# Patient Record
Sex: FEMALE | Race: AMERICAN INDIAN OR ALASKA NATIVE
[De-identification: names, ages, dates, MRNs, and addresses within clinical notes are randomized per-mention and may not be internally consistent; named-entity substitution may affect disease eponyms.]

---

## 2019-02-12 ENCOUNTER — HOSPITAL ENCOUNTER (EMERGENCY)
Dept: HOSPITAL 31 - C.ER | Age: 44
Discharge: HOME | End: 2019-02-12
Payer: SELF-PAY

## 2019-02-12 VITALS — SYSTOLIC BLOOD PRESSURE: 142 MMHG | TEMPERATURE: 97.8 F | RESPIRATION RATE: 18 BRPM | DIASTOLIC BLOOD PRESSURE: 76 MMHG

## 2019-02-12 VITALS — BODY MASS INDEX: 24 KG/M2

## 2019-02-12 VITALS — HEART RATE: 85 BPM

## 2019-02-12 VITALS — OXYGEN SATURATION: 100 %

## 2019-02-12 DIAGNOSIS — R19.7: ICD-10-CM

## 2019-02-12 DIAGNOSIS — R11.10: Primary | ICD-10-CM

## 2019-02-12 LAB
ALBUMIN SERPL-MCNC: 4.5 G/DL (ref 3.5–5)
ALBUMIN/GLOB SERPL: 1.5 {RATIO} (ref 1–2.1)
ALT SERPL-CCNC: 25 U/L (ref 9–52)
AST SERPL-CCNC: 29 U/L (ref 14–36)
BASOPHILS # BLD AUTO: 0 K/UL (ref 0–0.2)
BASOPHILS NFR BLD: 1.2 % (ref 0–2)
BILIRUB UR-MCNC: NEGATIVE MG/DL
BUN SERPL-MCNC: 9 MG/DL (ref 7–17)
CALCIUM SERPL-MCNC: 9.1 MG/DL (ref 8.6–10.4)
COLOR UR: YELLOW
EOSINOPHIL # BLD AUTO: 0.1 K/UL (ref 0–0.7)
EOSINOPHIL NFR BLD: 1.8 % (ref 0–4)
ERYTHROCYTE [DISTWIDTH] IN BLOOD BY AUTOMATED COUNT: 12.7 % (ref 11.5–14.5)
GFR NON-AFRICAN AMERICAN: > 60
GLUCOSE UR STRIP-MCNC: NEGATIVE MG/DL
HCG,QUALITATIVE URINE: NEGATIVE
HGB BLD-MCNC: 13.8 G/DL (ref 11–16)
LEUKOCYTE ESTERASE UR-ACNC: NEGATIVE LEU/UL
LIPASE: 66 U/L (ref 23–300)
LYMPHOCYTES # BLD AUTO: 2.3 K/UL (ref 1–4.3)
LYMPHOCYTES NFR BLD AUTO: 52.2 % (ref 20–40)
MCH RBC QN AUTO: 32.5 PG (ref 27–31)
MCHC RBC AUTO-ENTMCNC: 34.1 G/DL (ref 33–37)
MCV RBC AUTO: 95.4 FL (ref 81–99)
MONOCYTES # BLD: 0.3 K/UL (ref 0–0.8)
MONOCYTES NFR BLD: 7.9 % (ref 0–10)
NEUTROPHILS # BLD: 1.6 K/UL (ref 1.8–7)
NEUTROPHILS NFR BLD AUTO: 36.9 % (ref 50–75)
NRBC BLD AUTO-RTO: 0.1 % (ref 0–2)
PH UR STRIP: 6 [PH] (ref 5–8)
PLATELET # BLD: 197 K/UL (ref 130–400)
PMV BLD AUTO: 9 FL (ref 7.2–11.7)
PROT UR STRIP-MCNC: NEGATIVE MG/DL
RBC # BLD AUTO: 4.25 MIL/UL (ref 3.8–5.2)
RBC # UR STRIP: NEGATIVE /UL
SP GR UR STRIP: 1 (ref 1–1.03)
SQUAMOUS EPITHIAL: 1 /HPF (ref 0–5)
UROBILINOGEN UR-MCNC: 0.2 MG/DL (ref 0.2–1)
WBC # BLD AUTO: 4.3 K/UL (ref 4.8–10.8)

## 2019-02-12 PROCEDURE — 81001 URINALYSIS AUTO W/SCOPE: CPT

## 2019-02-12 PROCEDURE — 96361 HYDRATE IV INFUSION ADD-ON: CPT

## 2019-02-12 PROCEDURE — 84703 CHORIONIC GONADOTROPIN ASSAY: CPT

## 2019-02-12 PROCEDURE — 96375 TX/PRO/DX INJ NEW DRUG ADDON: CPT

## 2019-02-12 PROCEDURE — 80053 COMPREHEN METABOLIC PANEL: CPT

## 2019-02-12 PROCEDURE — 99283 EMERGENCY DEPT VISIT LOW MDM: CPT

## 2019-02-12 PROCEDURE — 96374 THER/PROPH/DIAG INJ IV PUSH: CPT

## 2019-02-12 PROCEDURE — 85025 COMPLETE CBC W/AUTO DIFF WBC: CPT

## 2019-02-12 PROCEDURE — 83690 ASSAY OF LIPASE: CPT

## 2019-02-12 NOTE — C.PDOC
History Of Present Illness


43 year old female with PMHx of HIV and bipolar disorder presents to the ED 

requesting medication refill. Patient recently moved to the area from [place] 

but has not established regular primary care. She has run out HIV and bipolar 

medications. Additionally patient reports having intermittent vomiting and 

diarrhea for the last 2 months, which worsened today. Patient was also visited 

by a  today, who told her she needed a medical evaluation. Vomiting

occurs mostly in the morning, is not every day, and is non-bloody. Otherwise 

patient is able to tolerate food normally. Reports she has 2 episodes of watery 

non-bloody diarrhea per day, last episode was this morning. Otherwise she denies

any fever, chills, weight loss, abdominal pain, or urinary complaints. Patient 

also denies having suicidal ideation, homicidal ideation, or auditory/visual 

hallucinations.


PMD- None





Time Seen by Provider: 02/12/19 13:50


Chief Complaint (Nursing): GI Problem


History Per: Patient


History/Exam Limitations: no limitations


Onset/Duration Of Symptoms: Intermittent Episodes


Current Symptoms Are (Timing): Still Present


Suicide/Self Injury Attempted (Context): None


Associated Symptoms: denies: Suicidal Thoughts, Suicidal Plan


Involuntary Hold By: None





Past Medical History


Reviewed: Historical Data, Nursing Documentation, Vital Signs


Vital Signs: 





                                Last Vital Signs











Temp  97.1 F L  02/12/19 13:27


 


Pulse  85   02/12/19 13:27


 


Resp  16   02/12/19 13:27


 


BP  140/96 H  02/12/19 13:27


 


Pulse Ox  100   02/12/19 13:27














- Medical History


PMH: Bipolar Disorder, HIV


Surgical History: No Surg Hx


Family History: States: No Known Family Hx





- Social History


Hx Tobacco Use: Yes


Hx Alcohol Use: Yes (occasional)


Hx Substance Use: No





- Immunization History


Hx Tetanus Toxoid Vaccination: No


Hx Influenza Vaccination: No


Hx Pneumococcal Vaccination: No





Review Of Systems


Except As Marked, All Systems Reviewed And Found Negative.


Constitutional: Negative for: Fever, Chills


Gastrointestinal: Positive for: Vomiting, Diarrhea.  Negative for: Abdominal 

Pain, Hematochezia, Hematemesis


Genitourinary: Negative for: Dysuria, Frequency


Neurological: Negative for: Weakness, Dizziness


Psych: Negative for: Psychosis, Suicidal ideation (or homicidal ideation)





Physical Exam





- Physical Exam


Appears: Non-toxic, No Acute Distress


Skin: Warm, Dry


Head: Normacephalic, Tenderness


Eye(s): bilateral: PERRL, EOMI


Oral Mucosa: Moist


Throat: Normal


Neck: Normal ROM, Trachea Midline


Lymphatic: No Adenopathy


Chest: Symmetrical


Cardiovascular: Rhythm Regular, No Murmur


Respiratory: Normal Breath Sounds, No Accessory Muscle Use, No Wheezing


Gastrointestinal/Abdominal: Soft, No Tenderness, No Mass, No Guarding, No 

Rebound


Back: Normal Inspection, No Paraspinal Tenderness


Extremity: Normal ROM, No Deformity


Neurological/Psych: Oriented x3, Normal Motor, Normal Sensation, Other (Normal 

affect, No sensorimotor deficits)





ED Course And Treatment





- Laboratory Results


Result Diagrams: 


                                 02/12/19 14:19





                                 02/12/19 14:19


Lab Results: 





                                        











Urine Color  Yellow  (YELLOW)   02/12/19  14:01    


 


Urine Clarity  Clear  (Clear)   02/12/19  14:01    


 


Urine pH  6.0  (5.0-8.0)   02/12/19  14:01    


 


Ur Specific Gravity  1.005  (1.003-1.030)   02/12/19  14:01    


 


Urine Protein  Negative mg/dL (NEGATIVE)   02/12/19  14:01    


 


Urine Glucose (UA)  Negative mg/dL (Normal)   02/12/19  14:01    


 


Urine Ketones  Negative mg/dL (NEGATIVE)   02/12/19  14:01    


 


Urine Blood  Negative  (NEGATIVE)   02/12/19  14:01    


 


Urine Nitrate  Negative  (NEGATIVE)   02/12/19  14:01    


 


Urine Bilirubin  Negative  (NEGATIVE)   02/12/19  14:01    


 


Urine Urobilinogen  0.2 mg/dL (0.2-1.0)   02/12/19  14:01    


 


Ur Leukocyte Esterase  Negative Jonatan/uL (Negative)   02/12/19  14:01    


 


Ur Squamous Epith Cells  1 /hpf (0-5)   02/12/19  14:01    


 


Urine HCG, Qual  Negative  (NEGATIVE)   02/12/19  14:01    








                                        











Urine HCG, Qual  Negative  (NEGATIVE)   02/12/19  14:01    











O2 Sat by Pulse Oximetry: 100 (RA)


Pulse Ox Interpretation: Normal





Medical Decision Making


Medical Decision Making: 


Impression: Intermittent vomiting and diarrhea with benign exam





Differential diagnosis includes but is not limited to: dyspepsia, 

gastroenteritis, dehydration, UTI, gastritis





Plan:


- CMP


- Urine drug screen


- Lipase


- CBC


- Urinalysis


- Urine preg


- 20 mg IV Pepcid


- 4 mg IV Zofran


- NS IV fluids infusing


- Reassess





Labs unremarkable


Pt stable for discharge with clinic followup











Disposition


Counseled Patient/Family Regarding: Studies Performed, Diagnosis





- Disposition


Referrals: 


[facility] [Outside] (CALL TO MAKE AN APPOINTMENT BY THE END OF

 THE WEEK)


Disposition: HOME/ ROUTINE


Disposition Time: 14:44


Condition: STABLE


Additional Instructions: 


PLEASE FOLLOWUP AT CLINIC FOR FURTHER MEDICAL AND PSYCHIATRIC CARE.


Prescriptions: 


Omeprazole Magnesium [Prilosec Otc] 20 mg PO DAILY #30 tcp


Ondansetron ODT [Zofran ODT] 1 odt PO Q6 PRN #20 odt


 PRN Reason: Nausea/Vomiting


Saccharomyces Boulardi [Florastor] 500 mg PO BID #28 cap


Instructions:  Nausea and Vomiting, Adult (DC)


Forms:  Work Excuse





- Clinical Impression


Clinical Impression: 


 Vomiting and diarrhea








- Scribe Statement


The provider has reviewed the documentation as recorded by the Gokul Lacey





Provider Attestation: 


All medical record entries made by the Franklynibjenna were at my direction and 

personally dictated by me. I have reviewed the chart and agree that the record 

accurately reflects my personal performance of the history, physical exam, 

medical decision making, and the department course for this patient. I have also

 personally directed, reviewed, and agree with the discharge instructions and 

disposition.